# Patient Record
Sex: MALE | Race: WHITE | NOT HISPANIC OR LATINO | URBAN - METROPOLITAN AREA
[De-identification: names, ages, dates, MRNs, and addresses within clinical notes are randomized per-mention and may not be internally consistent; named-entity substitution may affect disease eponyms.]

---

## 2018-04-26 ENCOUNTER — EMERGENCY (EMERGENCY)
Facility: HOSPITAL | Age: 53
LOS: 1 days | End: 2018-04-26
Payer: COMMERCIAL

## 2018-04-26 PROCEDURE — 99283 EMERGENCY DEPT VISIT LOW MDM: CPT

## 2021-06-15 ENCOUNTER — EMERGENCY (EMERGENCY)
Facility: HOSPITAL | Age: 56
LOS: 1 days | End: 2021-06-15
Admitting: EMERGENCY MEDICINE
Payer: COMMERCIAL

## 2021-06-15 PROCEDURE — 99284 EMERGENCY DEPT VISIT MOD MDM: CPT

## 2021-06-15 PROCEDURE — 70450 CT HEAD/BRAIN W/O DYE: CPT | Mod: 26

## 2023-02-03 ENCOUNTER — NON-APPOINTMENT (OUTPATIENT)
Age: 58
End: 2023-02-03

## 2023-02-03 ENCOUNTER — APPOINTMENT (OUTPATIENT)
Dept: ORTHOPEDIC SURGERY | Facility: CLINIC | Age: 58
End: 2023-02-03
Payer: OTHER MISCELLANEOUS

## 2023-02-03 VITALS — WEIGHT: 160 LBS | BODY MASS INDEX: 22.4 KG/M2 | HEIGHT: 71 IN

## 2023-02-03 DIAGNOSIS — I10 ESSENTIAL (PRIMARY) HYPERTENSION: ICD-10-CM

## 2023-02-03 PROBLEM — Z00.00 ENCOUNTER FOR PREVENTIVE HEALTH EXAMINATION: Status: ACTIVE | Noted: 2023-02-03

## 2023-02-03 PROCEDURE — 73030 X-RAY EXAM OF SHOULDER: CPT | Mod: RT

## 2023-02-03 PROCEDURE — 99072 ADDL SUPL MATRL&STAF TM PHE: CPT

## 2023-02-03 PROCEDURE — 99204 OFFICE O/P NEW MOD 45 MIN: CPT

## 2023-02-21 ENCOUNTER — FORM ENCOUNTER (OUTPATIENT)
Age: 58
End: 2023-02-21

## 2023-03-01 ENCOUNTER — APPOINTMENT (OUTPATIENT)
Dept: ORTHOPEDIC SURGERY | Facility: CLINIC | Age: 58
End: 2023-03-01
Payer: OTHER MISCELLANEOUS

## 2023-03-01 PROCEDURE — 99214 OFFICE O/P EST MOD 30 MIN: CPT

## 2023-03-01 PROCEDURE — 99072 ADDL SUPL MATRL&STAF TM PHE: CPT

## 2023-04-05 ENCOUNTER — APPOINTMENT (OUTPATIENT)
Dept: ORTHOPEDIC SURGERY | Facility: CLINIC | Age: 58
End: 2023-04-05
Payer: OTHER MISCELLANEOUS

## 2023-04-05 PROCEDURE — 99214 OFFICE O/P EST MOD 30 MIN: CPT

## 2023-05-05 NOTE — HISTORY OF PRESENT ILLNESS
[de-identified] : 56 y/o male presenting to the office with right shoulder pain (Acute traumatic fracture) from work related injury that occurred on 12/8/22. Patient reports traumatic slip and fall that resulted in fracture dislocation. He works as  for [employer]. \par \par He has been out of work since the injury. Patient has been under the care of another orthopedic provider. He was initially treated with sling immobilizer for 4 weeks and has recently started physical therapy. He continues to note limited ROM and severe pain. Patient presents today as second opinion for surgery.

## 2023-05-05 NOTE — WORK
[Fracture] : fracture [Was the competent medical cause of the injury] : was the competent medical cause of the injury [Are consistent with the injury] : are consistent with the injury [Total (100%)] : total (100%) [Does not reveal pre-existing condition(s) that may affect treatment/prognosis] : does not reveal pre-existing condition(s) that may affect treatment/prognosis [Cannot return to work because ________] : cannot return to work because [unfilled] [Patient] : patient [No Rx restrictions] : No Rx restrictions. [I provided the services listed above] :  I provided the services listed above. [FreeTextEntry1] : fair

## 2023-05-05 NOTE — WORK
[Fracture] : fracture [Was the competent medical cause of the injury] : was the competent medical cause of the injury [Total] : total [Cannot return to work because ________] : cannot return to work because [unfilled] [15+ days] : 15+ days [Patient] : patient [No Rx restrictions] : No Rx restrictions. [I provided the services listed above] :  I provided the services listed above. [FreeTextEntry1] : fair

## 2023-05-05 NOTE — PHYSICAL EXAM
[Right] : right shoulder [Fracture] : Fracture [de-identified] : Constitutional: The general appearance of the patient is well developed, well nourished, no deformities and well groomed. Normal\par \par Gait: Gait and function is as follows: normal gait.\par \par Skin: Head and neck visualized skin is normal. Left upper extremity visualized skin is normal. Right upper extremity visualized skin is normal. Thoracic Skin of the thoracic spine shows visualized skin is normal.\par \par Cardiovascular: palpable radial pulse bilaterally, good capillary refill in digits of the bilateral upper extremities and no temperature or color changes in the bilateral upper extremities.\par \par Lymphatic: Normal Palpation of lymph nodes in the cervical.\par \par Neurologic: fine motor control in the bilateral upper extremities is intact. Deep Tendon Reflexes in Upper and Lower Extremities Negative Cruz's in the bilateral upper extremities. The patient is oriented to time, place and person. Sensation to light touch intact in the bilateral upper extremities. Mood and Affect is normal.\par \par Right Shoulder: Inspection of the shoulder/upper arm is as follows: no scapula winging, no biceps deformity and no AC joint deformity. Palpation of the shoulder/upper arm is as follows: There is tenderness at the proximal biceps tendon. Range of motion of the shoulder is as follows: Pain with internal rotation, external rotation, abduction and forward flexion. Strength of the shoulder is as follows: Supraspinatus 4/5. External Rotation 4/5. Internal Rotation 4/5. Deltoid 5/5 Ligament Stability and Special Tests of the shoulder is as follows: Neer test is positive. Sharp' test is positive. Speed's test is positive.\par \par Left Shoulder: Inspection of the shoulder/upper arm is as follows: There is a full, pain-free, stable range of motion of the shoulder with normal strength and no tenderness to palpation.\par \par Neck:\par \par Inspection / Palpation of the cervical spine is as follows: mild paracervical tenderness. Range of motion of the cervical spine is as follows: moderately decreased range of motion of the cervical spine. Stability testing for the cervical spine is as follows Stable range of motion.\par \par Back, including spine: Inspection / Palpation of the thoracic/lumbar spine is as follows: There is a full, pain free, stable range of motion of the thoracic spine with a normal tone and not tenderness to palpation..\par

## 2023-05-05 NOTE — HISTORY OF PRESENT ILLNESS
[de-identified] : 56 y/o male presenting to the office with right shoulder pain (Acute traumatic fracture) from work related injury that occurred on 12/8/22. Patient reports traumatic slip and fall that resulted in fracture dislocation. He works as  for [employer]. He has been out of work since the injury. Patient has been under the care of another orthopedic provider. He was initially treated with sling immobilizer for 4 weeks and has recently started physical therapy. He continues to note limited ROM and severe pain. Patient presents today as second opinion for surgery.\par \par 3/1/23: 56 y/o male presenting to the office with right shoulder pain (Acute traumatic fracture) from work related injury that occurred on 12/8/22. Surgery was approved, patient elects to proceed. Patient is a smoker, but states he is practicing cessation from smoking. He continues to note limited ROM and severe pain.

## 2023-05-05 NOTE — HISTORY OF PRESENT ILLNESS
[de-identified] : 58 y/o male presenting to the office with right shoulder pain (Acute traumatic fracture) from work related injury that occurred on 12/8/22. Patient reports traumatic slip and fall that resulted in fracture dislocation. He works as  for [employer]. He has been out of work since the injury. Patient has been under the care of another orthopedic provider. He was initially treated with sling immobilizer for 4 weeks and has recently started physical therapy. He continues to note limited ROM and severe pain. Patient presents today as second opinion for surgery.\par \par 3/1/23: 58 y/o male presenting to the office with right shoulder pain (Acute traumatic fracture) from work related injury that occurred on 12/8/22. Surgery was approved, patient elects to proceed. Patient is a smoker, but states he is practicing cessation from smoking. He continues to note limited ROM and severe pain.\par \par 4/5/23: Patient presents for follow up of right shoulder pain. He continues to have pain and difficulty with ROM. He has been working on smoking cessation. He was recently approved for surgery.

## 2023-05-05 NOTE — PHYSICAL EXAM
[de-identified] : Constitutional: The general appearance of the patient is well developed, well nourished, no deformities and well groomed. Normal\par \par Gait: Gait and function is as follows: normal gait.\par \par Skin: Head and neck visualized skin is normal. Left upper extremity visualized skin is normal. Right upper extremity visualized skin is normal. Thoracic Skin of the thoracic spine shows visualized skin is normal.\par \par Cardiovascular: palpable radial pulse bilaterally, good capillary refill in digits of the bilateral upper extremities and no temperature or color changes in the bilateral upper extremities.\par \par Lymphatic: Normal Palpation of lymph nodes in the cervical.\par \par Neurologic: fine motor control in the bilateral upper extremities is intact. Deep Tendon Reflexes in Upper and Lower Extremities Negative Cruz's in the bilateral upper extremities. The patient is oriented to time, place and person. Sensation to light touch intact in the bilateral upper extremities. Mood and Affect is normal.\par \par Right Shoulder: Inspection of the shoulder/upper arm is as follows: no scapula winging, no biceps deformity and no AC joint deformity. Palpation of the shoulder/upper arm is as follows: There is tenderness at the proximal biceps tendon. Range of motion of the shoulder is as follows: Pain with internal rotation, external rotation, abduction and forward flexion. Strength of the shoulder is as follows: Supraspinatus 4/5. External Rotation 4/5. Internal Rotation 4/5. Deltoid 5/5 Ligament Stability and Special Tests of the shoulder is as follows: Neer test is positive. Sharp' test is positive. Speed's test is positive.\par \par Left Shoulder: Inspection of the shoulder/upper arm is as follows: There is a full, pain-free, stable range of motion of the shoulder with normal strength and no tenderness to palpation.\par \par Neck:\par \par Inspection / Palpation of the cervical spine is as follows: mild paracervical tenderness. Range of motion of the cervical spine is as follows: moderately decreased range of motion of the cervical spine. Stability testing for the cervical spine is as follows Stable range of motion.\par \par Back, including spine: Inspection / Palpation of the thoracic/lumbar spine is as follows: There is a full, pain free, stable range of motion of the thoracic spine with a normal tone and not tenderness to palpation..\par

## 2023-05-05 NOTE — PHYSICAL EXAM
[Right] : right shoulder [Fracture] : Fracture [de-identified] : Constitutional: The general appearance of the patient is well developed, well nourished, no deformities and well groomed. Normal\par \par Gait: Gait and function is as follows: normal gait.\par \par Skin: Head and neck visualized skin is normal. Left upper extremity visualized skin is normal. Right upper extremity visualized skin is normal. Thoracic Skin of the thoracic spine shows visualized skin is normal.\par \par Cardiovascular: palpable radial pulse bilaterally, good capillary refill in digits of the bilateral upper extremities and no temperature or color changes in the bilateral upper extremities.\par \par Lymphatic: Normal Palpation of lymph nodes in the cervical.\par \par Neurologic: fine motor control in the bilateral upper extremities is intact. Deep Tendon Reflexes in Upper and Lower Extremities Negative Cruz's in the bilateral upper extremities. The patient is oriented to time, place and person. Sensation to light touch intact in the bilateral upper extremities. Mood and Affect is normal.\par \par Right Shoulder: Inspection of the shoulder/upper arm is as follows: no scapula winging, no biceps deformity and no AC joint deformity. Palpation of the shoulder/upper arm is as follows: There is tenderness at the proximal biceps tendon. Range of motion of the shoulder is as follows: Pain with internal rotation, external rotation, abduction and forward flexion. Strength of the shoulder is as follows: Supraspinatus 4/5. External Rotation 4/5. Internal Rotation 4/5. Deltoid 5/5 Ligament Stability and Special Tests of the shoulder is as follows: Neer test is positive. Sharp' test is positive. Speed's test is positive.\par \par Left Shoulder: Inspection of the shoulder/upper arm is as follows: There is a full, pain-free, stable range of motion of the shoulder with normal strength and no tenderness to palpation.\par \par Neck:\par \par Inspection / Palpation of the cervical spine is as follows: mild paracervical tenderness. Range of motion of the cervical spine is as follows: moderately decreased range of motion of the cervical spine. Stability testing for the cervical spine is as follows Stable range of motion.\par \par Back, including spine: Inspection / Palpation of the thoracic/lumbar spine is as follows: There is a full, pain free, stable range of motion of the thoracic spine with a normal tone and not tenderness to palpation..\par

## 2023-05-05 NOTE — WORK
[Fracture] : fracture [Was the competent medical cause of the injury] : was the competent medical cause of the injury [Are consistent with the injury] : are consistent with the injury [Consistent with my objective findings] : consistent with my objective findings [Total] : total [Cannot return to work because ________] : cannot return to work because [unfilled] [15+ days] : 15+ days [Patient] : patient [No Rx restrictions] : No Rx restrictions. [I provided the services listed above] :  I provided the services listed above. [FreeTextEntry1] : fair

## 2023-05-05 NOTE — DISCUSSION/SUMMARY
[de-identified] : 56 y/o male presenting to the office with right shoulder pain from work related injury that occurred on 12/8/22. Patient reports traumatic slip and fall that resulted in fracture dislocation. He works as  for [employer].\par \par He continues to note limited ROM and severe pain. \par Patient wishes to proceed with surgical intervention, surgery was approved. \par Discussed continued smoking cessation.\par Patient will need a CT scan for pre operative planning, CT referral provided. \par Patient will need medical and cardiac clearance 30 days within surgery date.\par \par Patient will remain out of work until further notice\par \par Pt has an acute traumatic proximal humerus fracture and is here for surgical options.\par Arthroplasty is recommended due to the fracture pattern and the malunion.\par The patient is having severe pain with forward flexion and/or pseudoparalysis. \par The patient elects to proceed with surgical treatment.\par \par He will follow up at preop visit. \par \par The patient is indicated for a Right reverse shoulder arthroplasty, Open Treatment of Proximal humerus fracture biceps tenodesis.\par  \par * Surgery: Considering patient has failed all conservative treatment we are proceeding with:\par -	Right shoulder Reverse Shoulder Arthroplasty (CPT 17187) with Biceps Tenodesis (CPT 80504), ORIF right proximal humerus [id number]\par Pt would like surgery June 20th\par \par The patient will require a [place] Arc 2.0 brace, cryotherapy, and 12 weeks of post-operative physical therapy.\par The patient will require a medical clearance and cardiac (smoker)\par [facility], 2 hours, and one assistant.\par  \par (1) We discussed a comprehensive treatment plans that included a prescription management plan involving the use of prescription strength medications to include Ibuprofen 600-800 mg TID, versus 500-650 mg Tylenol. We also discussed prescribing topical diclofenac (Voltaren gel) as well as once daily Meloxicam 15 mg.\par (2) The patient has More Than One chronic injuries/illnesses as outlined, discussed, and documented by ICD 10 codes listed, as well as the HPI and Plan section.\par There is a moderate risk of morbidity with further treatment, especially from use of prescription strength medications and possible side effects of these medications which include upset stomach and cardiac/renal issues with long term use were discussed.\par (3) I recommended that the patient follow-up with their medical physician to discuss any significant specific potential issues with long term use such as interactions with current medications or with exacerbation of underlying medical morbidities. \par \par Attestation:\par I, Deandra Alberto, attest that this documentation has been prepared under the direction and in the presence of Provider Rohit Tobin MD.\par The documentation recorded by the scribe, in my presence, accurately reflects the service I personally performed, and the decisions made by me with my edits as appropriate.\par Rohit Tobin MD

## 2023-05-05 NOTE — DISCUSSION/SUMMARY
[de-identified] : 58 y/o male presenting to the office with right shoulder pain from work related injury that occurred on 12/8/22. Patient reports traumatic slip and fall that resulted in fracture dislocation. He works as  for [employer].\par X-rays were reviewed with the patient today\par He continues to note limited ROM and severe pain. \par Patient wishes to submit for surgery authorization - needs to be expedited due to acute trauamtic fracture\par Patient will need a CT scan for pre operative planning\par Patient will need medical and cardiac clearance 30 days within surgery date.\par \par Patient will remain out of work until further notice\par \par Pt has an acute traumatic proxmial humerus fracture and is here for surgical options.\par Arthroplasty is recommended due to the fracture pattern and the malunion.\par The patient is having severe pain with forward flexion and/or pseudoparalysis. \par The patient is interested in pursuing surgical treatment.\par \par We had a lengthy discussion about the surgery as well as the recovery. We discussed the risks which include but are not limited to infection, bleeding, need for blood transfusions, failure of treatment to alleviate all pain or improve function, the risk of injury to nerves and blood vessels.  There is also the risks inherent to anesthesia as well.  We discussed that given the patient’s distorted anatomy as a result of arthritis, these risks are real although every attempt will be made to mitigate these at the time of surgery.\par Pt understands there is no guarantee of success, however there is a high likelihood of functional improvement and pain relief. The patient understood all this was discussed.\par \par The patient is indicated for a Right reverse shoulder arthroplasty, Open Treatment of Proximal humerus fracture biceps tenodesis.\par  \par * Surgery: Considering patient has failed all conservative treatment we are requesting authorization for:\par -	Right shoulder Reverse Shoulder Arthroplasty (CPT 19699) with Biceps Tenodesis (CPT 82646), ORIF right proxmial humerus [id number]\par Pt would like surgery (soon after approval)\par \par The patient will require a [place] Arc 2.0 brace, cryotherapy, and 12 weeks of post-operative physical therapy.\par The patient will require a medical clearance and cardiac (smoker)\par The doctor will require the [employer] representative, 2 hours, and one assistant.\par  \par \par (1) We discussed a comprehensive treatment plans that included a prescription management plan involving the use of prescription strength medications to include Ibuprofen 600- 800 mg TID, versus 500- 650 mg Tylenol. We also discussed prescribing topical diclofenac (Voltaren gel) as well as once daily Meloxicam 15 mg.\par \par (2) The patient has More Than One chronic injuries/illnesses as outlined, discussed, and documented by ICD 10 codes listed, as well as the HPI and Plan section.\par \par There is a moderate risk of morbidity with further treatment, especially from use of prescription strength medications and possible side effects of these medications which include upset stomach and cardiac/renal issues with long term use were discussed.\par \par (3) I recommended that the patient follow-up with their medical physician to discuss any significant specific potential issues with long term use such as interactions with current medications or with exacerbation of underlying medical morbidities.\par \par Attestation:\par \par I, Neel Warren, attest that this documentation has been prepared under the direction and in the presence of Provider Rohit Tobin MD.\par \par The documentation recorded by the scribe, in my presence, accurately reflects the service I personally performed, and the decisions made by me with my edits as appropriate.\par \par Rohit Tobin MD\par

## 2023-06-14 ENCOUNTER — APPOINTMENT (OUTPATIENT)
Dept: ORTHOPEDIC SURGERY | Facility: CLINIC | Age: 58
End: 2023-06-14

## 2023-06-14 ENCOUNTER — APPOINTMENT (OUTPATIENT)
Dept: ORTHOPEDIC SURGERY | Facility: CLINIC | Age: 58
End: 2023-06-14
Payer: OTHER MISCELLANEOUS

## 2023-06-14 VITALS — WEIGHT: 160 LBS | HEIGHT: 71 IN | BODY MASS INDEX: 22.4 KG/M2

## 2023-06-14 PROCEDURE — 99214 OFFICE O/P EST MOD 30 MIN: CPT | Mod: ACP

## 2023-06-14 PROCEDURE — L3960: CPT | Mod: RT

## 2023-06-19 NOTE — HISTORY OF PRESENT ILLNESS
[de-identified] : 58 y/o male presenting to the office with right shoulder pain (Acute traumatic fracture) from work related injury that occurred on 12/8/22. Patient reports traumatic slip and fall that resulted in fracture dislocation. He works as  for [employer]. He has been out of work since the injury. Patient has been under the care of another orthopedic provider. He was initially treated with sling immobilizer for 4 weeks and has recently started physical therapy. He continues to note limited ROM and severe pain. Patient presents today as second opinion for surgery.\par \par 3/1/23: 58 y/o male presenting to the office with right shoulder pain (Acute traumatic fracture) from work related injury that occurred on 12/8/22. Surgery was approved, patient elects to proceed. Patient is a smoker, but states he is practicing cessation from smoking. He continues to note limited ROM and severe pain.\par \par 4/5/23: Patient presents for follow up of right shoulder pain. He continues to have pain and difficulty with ROM. He has been working on smoking cessation. He was recently approved for surgery. \par \par 6/14/23: Pt here for a follow up of right shoulder pain.  Patient reports worsening pain.  Pain has been affecting his quality of life.  He wishes to discuss surgery.  Does report elevated A1c level at recent PST.

## 2023-06-19 NOTE — DISCUSSION/SUMMARY
[de-identified] : 56 y/o male presenting to the office with right shoulder pain from work related injury that occurred on 12/8/22. Patient reports traumatic slip and fall that resulted in fracture dislocation. He works as  for [employer].\par \par He continues to note limited ROM and severe pain. \par Patient is indicated for right reverse total shoulder arthroplasty.\par patient wishes to proceed with surgical intervention, surgery was approved. \par Discussed continued smoking cessation.\par During PST patient was found to have elevated hemoglobin A1C of 8.7\par At this point time patient is not optimized for surgery.  He will schedule outpatient appointment with endocrinologist for better blood sugar control.\par Discussed with patient once his level is below 8.0 we can consider rescheduling joint replacement.\par Patient will follow-up in 4 to 6 weeks and updated blood work.\par Patient will remain out of work until further notice\par \par \par The patient is indicated for a Right reverse shoulder arthroplasty, Open Treatment of Proximal humerus fracture biceps tenodesis.\par  \par * Surgery: Considering patient has failed all conservative treatment we are proceeding with:\par -	Right shoulder Reverse Shoulder Arthroplasty (CPT 65010) with Biceps Tenodesis (CPT 21570), ORIF right proximal humerus [id number]\par Pt would like surgery June 20th\par \par The patient will require a [place] Arc 2.0 brace, cryotherapy, and 12 weeks of post-operative physical therapy.\par The patient will require a medical clearance and cardiac (smoker)\par The doctor will require the [employer] representative, 2 hours, and one assistant.\par  \par \par (1) We discussed a comprehensive treatment plans that included a prescription management plan involving the use of prescription strength medications to include Ibuprofen 600-800 mg TID, versus 500-650 mg Tylenol. We also discussed prescribing topical diclofenac (Voltaren gel) as well as once daily Meloxicam 15 mg.\par (2) The patient has More Than One chronic injuries/illnesses as outlined, discussed, and documented by ICD 10 codes listed, as well as the HPI and Plan section.\par There is a moderate risk of morbidity with further treatment, especially from use of prescription strength medications and possible side effects of these medications which include upset stomach and cardiac/renal issues with long term use were discussed.\par (3) I recommended that the patient follow-up with their medical physician to discuss any significant specific potential issues with long term use such as interactions with current medications or with exacerbation of underlying medical morbidities. \par

## 2023-06-19 NOTE — PHYSICAL EXAM
[de-identified] : Constitutional: The general appearance of the patient is well developed, well nourished, no deformities and well groomed. Normal\par \par Gait: Gait and function is as follows: normal gait.\par \par Skin: Head and neck visualized skin is normal. Left upper extremity visualized skin is normal. Right upper extremity visualized skin is normal. Thoracic Skin of the thoracic spine shows visualized skin is normal.\par \par Cardiovascular: palpable radial pulse bilaterally, good capillary refill in digits of the bilateral upper extremities and no temperature or color changes in the bilateral upper extremities.\par \par Lymphatic: Normal Palpation of lymph nodes in the cervical.\par \par Neurologic: fine motor control in the bilateral upper extremities is intact. Deep Tendon Reflexes in Upper and Lower Extremities Negative Cruz's in the bilateral upper extremities. The patient is oriented to time, place and person. Sensation to light touch intact in the bilateral upper extremities. Mood and Affect is normal.\par \par Right Shoulder: Inspection of the shoulder/upper arm is as follows: no scapula winging, no biceps deformity and no AC joint deformity. Palpation of the shoulder/upper arm is as follows: There is tenderness at the proximal biceps tendon. Range of motion of the shoulder is as follows: Pain with internal rotation, external rotation, abduction and forward flexion. Strength of the shoulder is as follows: Supraspinatus 4/5. External Rotation 4/5. Internal Rotation 4/5. Deltoid 5/5 Ligament Stability and Special Tests of the shoulder is as follows: Neer test is positive. Sharp' test is positive. Speed's test is positive.\par \par Left Shoulder: Inspection of the shoulder/upper arm is as follows: There is a full, pain-free, stable range of motion of the shoulder with normal strength and no tenderness to palpation.\par \par Neck:\par \par Inspection / Palpation of the cervical spine is as follows: mild paracervical tenderness. Range of motion of the cervical spine is as follows: moderately decreased range of motion of the cervical spine. Stability testing for the cervical spine is as follows Stable range of motion.\par \par Back, including spine: Inspection / Palpation of the thoracic/lumbar spine is as follows: There is a full, pain free, stable range of motion of the thoracic spine with a normal tone and not tenderness to palpation..\par

## 2023-06-20 ENCOUNTER — APPOINTMENT (OUTPATIENT)
Dept: ORTHOPEDIC SURGERY | Facility: HOSPITAL | Age: 58
End: 2023-06-20

## 2023-06-28 ENCOUNTER — APPOINTMENT (OUTPATIENT)
Dept: ORTHOPEDIC SURGERY | Facility: CLINIC | Age: 58
End: 2023-06-28

## 2023-08-03 ENCOUNTER — NON-APPOINTMENT (OUTPATIENT)
Age: 58
End: 2023-08-03

## 2023-08-03 ENCOUNTER — APPOINTMENT (OUTPATIENT)
Dept: NEPHROLOGY | Facility: CLINIC | Age: 58
End: 2023-08-03
Payer: COMMERCIAL

## 2023-08-03 VITALS
RESPIRATION RATE: 16 BRPM | SYSTOLIC BLOOD PRESSURE: 124 MMHG | WEIGHT: 145 LBS | OXYGEN SATURATION: 97 % | DIASTOLIC BLOOD PRESSURE: 80 MMHG | TEMPERATURE: 98 F | HEART RATE: 87 BPM | HEIGHT: 71 IN | BODY MASS INDEX: 20.3 KG/M2

## 2023-08-03 DIAGNOSIS — Z78.9 OTHER SPECIFIED HEALTH STATUS: ICD-10-CM

## 2023-08-03 DIAGNOSIS — Z84.1 FAMILY HISTORY OF DISORDERS OF KIDNEY AND URETER: ICD-10-CM

## 2023-08-03 DIAGNOSIS — Z83.3 FAMILY HISTORY OF DIABETES MELLITUS: ICD-10-CM

## 2023-08-03 DIAGNOSIS — F17.200 NICOTINE DEPENDENCE, UNSPECIFIED, UNCOMPLICATED: ICD-10-CM

## 2023-08-03 PROCEDURE — 99496 TRANSJ CARE MGMT HIGH F2F 7D: CPT | Mod: 25

## 2023-08-03 PROCEDURE — 99407 BEHAV CHNG SMOKING > 10 MIN: CPT

## 2023-08-03 RX ORDER — LOSARTAN POTASSIUM 25 MG/1
25 TABLET, FILM COATED ORAL
Refills: 0 | Status: ACTIVE | COMMUNITY

## 2023-08-03 RX ORDER — GABAPENTIN 300 MG/1
300 CAPSULE ORAL
Refills: 0 | Status: ACTIVE | COMMUNITY

## 2023-08-03 RX ORDER — METFORMIN HYDROCHLORIDE 1000 MG/1
1000 TABLET, FILM COATED, EXTENDED RELEASE ORAL
Refills: 0 | Status: ACTIVE | COMMUNITY

## 2023-08-03 RX ORDER — AMLODIPINE BESYLATE 10 MG/1
10 TABLET ORAL
Refills: 0 | Status: ACTIVE | COMMUNITY

## 2023-08-03 NOTE — HISTORY OF PRESENT ILLNESS
[FreeTextEntry1] : Patient is a 57 year old male admitted to [facility] on 7/27 for hyponatremia in 120s; pt is a  states he has been drinking 120 oz of coffee + coca cola daily with 1 meal a day; sodium increased to 131 in hospital and patient signed out AMA; states he feels better and has been restricting his fluid intake and taking 3 meals daily. Pt called by us within 24 hrs of dc for 7 day post hospitalization follow up.

## 2023-08-03 NOTE — PHYSICAL EXAM

## 2023-08-03 NOTE — ASSESSMENT
[FreeTextEntry1] : 1) Hyponatremia 2) Smoker 3) Pain shoulder 4) DM  Will get baseline labs r/o SIADH Fluid restrict 1.5L daily + eat more protein Discussed smoking cessation for > 7 min RTC 4-6 weeks

## 2023-08-15 LAB
ALBUMIN SERPL ELPH-MCNC: 4.4 G/DL
ANION GAP SERPL CALC-SCNC: 13 MMOL/L
APPEARANCE: CLEAR
BACTERIA: NEGATIVE /HPF
BILIRUBIN URINE: NEGATIVE
BLOOD URINE: NEGATIVE
BUN SERPL-MCNC: 8 MG/DL
CALCIUM SERPL-MCNC: 9.4 MG/DL
CAST: 0 /LPF
CHLORIDE ?TM UR-SCNC: 35 MMOL/L
CHLORIDE SERPL-SCNC: 92 MMOL/L
CO2 SERPL-SCNC: 25 MMOL/L
COLOR: YELLOW
CREAT SERPL-MCNC: 0.63 MG/DL
CREAT SPEC-SCNC: 34 MG/DL
CREAT/PROT UR: 0.2 RATIO
EGFR: 112 ML/MIN/1.73M2
EPITHELIAL CELLS: 0 /HPF
ESTIMATED AVERAGE GLUCOSE: 186 MG/DL
GLUCOSE QUALITATIVE U: 500 MG/DL
GLUCOSE SERPL-MCNC: 153 MG/DL
HBA1C MFR BLD HPLC: 8.1 %
KETONES URINE: NEGATIVE MG/DL
LEUKOCYTE ESTERASE URINE: NEGATIVE
MICROSCOPIC-UA: NORMAL
NITRITE URINE: NEGATIVE
OSMOLALITY UR: 214 MOSM/KG
PH URINE: 7
PHOSPHATE SERPL-MCNC: 3.5 MG/DL
POTASSIUM SERPL-SCNC: 4.9 MMOL/L
PROT UR-MCNC: 7 MG/DL
PROTEIN URINE: NEGATIVE MG/DL
PTH RELATED PROT SERPL-MCNC: <2 PMOL/L
RED BLOOD CELLS URINE: 0 /HPF
SODIUM ?TM SUB UR QN: 32 MMOL/L
SODIUM SERPL-SCNC: 130 MMOL/L
SPECIFIC GRAVITY URINE: 1.01
UROBILINOGEN URINE: 0.2 MG/DL
WHITE BLOOD CELLS URINE: 0 /HPF

## 2023-11-15 ENCOUNTER — APPOINTMENT (OUTPATIENT)
Dept: ORTHOPEDIC SURGERY | Facility: CLINIC | Age: 58
End: 2023-11-15
Payer: OTHER MISCELLANEOUS

## 2023-11-15 PROCEDURE — 99214 OFFICE O/P EST MOD 30 MIN: CPT

## 2023-11-15 PROCEDURE — 73030 X-RAY EXAM OF SHOULDER: CPT | Mod: RT

## 2024-01-17 ENCOUNTER — APPOINTMENT (OUTPATIENT)
Dept: ORTHOPEDIC SURGERY | Facility: CLINIC | Age: 59
End: 2024-01-17
Payer: OTHER MISCELLANEOUS

## 2024-01-17 PROCEDURE — 99214 OFFICE O/P EST MOD 30 MIN: CPT

## 2024-01-17 NOTE — PHYSICAL EXAM
[Right] : right shoulder [The fracture is in acceptable alignment. There is progression in healing seen] : The fracture is in acceptable alignment. There is progression in healing seen [de-identified] : Constitutional: The general appearance of the patient is well developed, well nourished, no deformities and well groomed. Normal\par  \par  Gait: Gait and function is as follows: normal gait.\par  \par  Skin: Head and neck visualized skin is normal. Left upper extremity visualized skin is normal. Right upper extremity visualized skin is normal. Thoracic Skin of the thoracic spine shows visualized skin is normal.\par  \par  Cardiovascular: palpable radial pulse bilaterally, good capillary refill in digits of the bilateral upper extremities and no temperature or color changes in the bilateral upper extremities.\par  \par  Lymphatic: Normal Palpation of lymph nodes in the cervical.\par  \par  Neurologic: fine motor control in the bilateral upper extremities is intact. Deep Tendon Reflexes in Upper and Lower Extremities Negative Cruz's in the bilateral upper extremities. The patient is oriented to time, place and person. Sensation to light touch intact in the bilateral upper extremities. Mood and Affect is normal.\par  \par  Right Shoulder: Inspection of the shoulder/upper arm is as follows: no scapula winging, no biceps deformity and no AC joint deformity. Palpation of the shoulder/upper arm is as follows: There is tenderness at the proximal biceps tendon. Range of motion of the shoulder is as follows: Pain with internal rotation, external rotation, abduction and forward flexion. Strength of the shoulder is as follows: Supraspinatus 4/5. External Rotation 4/5. Internal Rotation 4/5. Deltoid 5/5 Ligament Stability and Special Tests of the shoulder is as follows: Neer test is positive. Sharp' test is positive. Speed's test is positive.\par  \par  Left Shoulder: Inspection of the shoulder/upper arm is as follows: There is a full, pain-free, stable range of motion of the shoulder with normal strength and no tenderness to palpation.\par  \par  Neck:\par  \par  Inspection / Palpation of the cervical spine is as follows: mild paracervical tenderness. Range of motion of the cervical spine is as follows: moderately decreased range of motion of the cervical spine. Stability testing for the cervical spine is as follows Stable range of motion.\par  \par  Back, including spine: Inspection / Palpation of the thoracic/lumbar spine is as follows: There is a full, pain free, stable range of motion of the thoracic spine with a normal tone and not tenderness to palpation..\par

## 2024-01-17 NOTE — DISCUSSION/SUMMARY
[de-identified] : 59 y/o male presenting to the office with right shoulder pain from work related injury that occurred on 12/8/22.  Patient reports traumatic slip and fall that resulted in fracture dislocation. He works as  for [employer].  He continues to note limited ROM and severe pain.  Patient is indicated for right reverse total shoulder arthroplasty  Patient will remain out of work until further notice Follow up 6 weeks  Patient is having cervical pain and limited motion. Pt will see Cervical provider to determine whether this is believed to be a result of the work-related injury.     (1) We discussed a comprehensive treatment plans that included a prescription management plan involving the use of prescription strength medications to include Ibuprofen 600-800 mg TID, versus 500-650 mg Tylenol. We also discussed prescribing topical diclofenac (Voltaren gel) as well as once daily Meloxicam 15 mg. (2) The patient has More Than One chronic injuries/illnesses as outlined, discussed, and documented by ICD 10 codes listed, as well as the HPI and Plan section. There is a moderate risk of morbidity with further treatment, especially from use of prescription strength medications and possible side effects of these medications which include upset stomach and cardiac/renal issues with long term use were discussed. (3) I recommended that the patient follow-up with their medical physician to discuss any significant specific potential issues with long term use such as interactions with current medications or with exacerbation of underlying medical morbidities.   Attestation: I, Ana Fonseca , attest that this documentation has been prepared under the direction and in the presence of Provider Rohit Tobin MD. The documentation recorded by the scribe, in my presence, accurately reflects the service I personally performed, and the decisions made by me with my edits as appropriate. Rohit Tobin MD

## 2024-01-17 NOTE — HISTORY OF PRESENT ILLNESS
[de-identified] : 56 y/o male presenting to the office with right shoulder pain (Acute traumatic fracture) from work related injury that occurred on 12/8/22. Patient reports traumatic slip and fall that resulted in fracture dislocation. He works as  for [employer]. He has been out of work since the injury. Patient has been under the care of another orthopedic provider. He was initially treated with sling immobilizer for 4 weeks and has recently started physical therapy. He continues to note limited ROM and severe pain. Patient presents today as second opinion for surgery.  3/1/23: 56 y/o male presenting to the office with right shoulder pain (Acute traumatic fracture) from work related injury that occurred on 12/8/22. Surgery was approved, patient elects to proceed. Patient is a smoker, but states he is practicing cessation from smoking. He continues to note limited ROM and severe pain.  4/5/23: Patient presents for follow up of right shoulder pain. He continues to have pain and difficulty with ROM. He has been working on smoking cessation. He was recently approved for surgery.   6/14/23: Pt here for a follow up of right shoulder pain.  Patient reports worsening pain.  Pain has been affecting his quality of life.  He wishes to discuss surgery.  Does report elevated A1c level at recent PST.  11/15/23: Patient returns today for follow up of right shoulder pain.  Patient continues to report constant pain and limited range of motion.  He states his most recent hemoglobin A1c was 7.1.  1/17/24: Patient here for right shoulder pain. Pt admits the pain has not gotten better since last visit.  Patient is also reporting that he has crepitus pain and stiffness in his neck with radicular symptoms radiating down into his hand.  He reports that this was not present before his work-related fracture.

## 2024-01-30 ENCOUNTER — RESULT REVIEW (OUTPATIENT)
Age: 59
End: 2024-01-30

## 2024-01-31 ENCOUNTER — APPOINTMENT (OUTPATIENT)
Dept: ORTHOPEDIC SURGERY | Facility: CLINIC | Age: 59
End: 2024-01-31
Payer: COMMERCIAL

## 2024-01-31 VITALS — WEIGHT: 145 LBS | HEIGHT: 71 IN | BODY MASS INDEX: 20.3 KG/M2

## 2024-01-31 DIAGNOSIS — M54.2 CERVICALGIA: ICD-10-CM

## 2024-01-31 DIAGNOSIS — M54.12 RADICULOPATHY, CERVICAL REGION: ICD-10-CM

## 2024-01-31 DIAGNOSIS — E11.9 TYPE 2 DIABETES MELLITUS W/OUT COMPLICATIONS: ICD-10-CM

## 2024-01-31 PROCEDURE — 72050 X-RAY EXAM NECK SPINE 4/5VWS: CPT

## 2024-01-31 PROCEDURE — 99203 OFFICE O/P NEW LOW 30 MIN: CPT

## 2024-02-04 NOTE — DATA REVIEWED
[FreeTextEntry1] : On my interpretations of these images from [facility] in house on 01/31/2024: I have independently reviewed and interpreted these images and reports. 4 V cervical XR reveals mod DDD c6/7 and mild DDD of c4/5 and C5/6, flex /ex reveals no listhesis.

## 2024-02-04 NOTE — DISCUSSION/SUMMARY
[de-identified] : 57 y/o male with cervical radiculopathy. I believe that his cervical radiculopathy has been caused by his workplace injury on 12/8/22. I am requesting a cervical MRI, since the patient has neck pain since workplace injury and in addition plans to proceed with reverse TSA, for which it is imperative that C5 root be working properly, to eval for HNP VS stenosis. F/U in 6-8 wks.   Prior to appointment and during encounter with patient extensive medical records were reviewed including but not limited to, hospital records, out patient records, imaging results, and lab data. During this appointment the patient was examined, diagnoses were discussed and explained in a face to face manner. In addition extensive time was spent reviewing aforementioned diagnostic studies. Counseling including abnormal image results, differential diagnoses, treatment options, risk and benefits, lifestyle changes, current condition, and current medications was performed. Patient's comments, questions, and concerns were address and patient verbalized understanding. Based on this patient's presentation at our office, which is an orthopedic spine surgeon's office, this patient inherently / intrinsically has a risk, however minute, of developing issues such as Cauda equina syndrome, bowel and bladder changes, or progression of motor or neurological deficits such as paralysis which may be permanent.   I, Cora Parsons, attest that this documentation has been prepared under the direction and in the presence of provider Brady Truong MD.

## 2024-02-04 NOTE — PHYSICAL EXAM
[NL (80)] : left lateral rotation 80 degrees [4___] : right grasp 4[unfilled]/5 [de-identified] : Constitutional: - General Appearance: Unremarkable Body Habitus Well Developed Well Nourished Body Habitus No Deformities Well Groomed Ability To communicate: Normal Neurologic: Global sensation is intact to upper and lower extremities. See examination of Neck and/or Spine for exceptions. Orientation to Time, Place and Person is: Normal Mood And Affect is Normal Skin: - Head/Face, Right Upper/Lower Extremity, Left Upper/Lower Extremity: Normal See Examination of Neck and/or Spine for exceptions Cardiovascular: Peripheral Cardiovascular System is Normal Palpation of Lymph Nodes: Normal Palpation of lymph nodes in: Axilla, Cervical, Inguinal Abnormal Palpation of lymph nodes in: None  [] : negative Spurling [FreeTextEntry8] : audible crepitus.  [de-identified] : paresthesia's in C6 or C7 Distributuion.  [TWNoteComboBox7] : forward flexion 20 degrees [de-identified] : extension 10 degrees [TWNoteComboBox6] : right lateral rotation 60 degrees

## 2024-02-04 NOTE — HISTORY OF PRESENT ILLNESS
[Radiating] : radiating [Sharp] : sharp [Not working due to injury] : Work status: not working due to injury [de-identified] : 01/31/2024: Patient presenting today for a new issue. Workplace injury on 12/8/22, he works at a [employer], patient slipped and fell headfirst, down hole, he reports RT shoulder and rt sided neck pain with radicular symptoms since. Reports no audible crepitus prior to injury, now "hears clicking". Full extension of neck results in radiating pain down RT arm.  Indicated for reverse TSA by Dr. Tobin.   DOI: 12/8/22   [] : no [FreeTextEntry6] : numbness [FreeTextEntry7] : arm/elbow/hand [FreeTextEntry9] : turning neck to the left [de-identified] : turning neck to the right

## 2024-02-28 ENCOUNTER — APPOINTMENT (OUTPATIENT)
Dept: ORTHOPEDIC SURGERY | Facility: CLINIC | Age: 59
End: 2024-02-28
Payer: OTHER MISCELLANEOUS

## 2024-02-28 VITALS — HEIGHT: 71 IN | WEIGHT: 165 LBS | BODY MASS INDEX: 23.1 KG/M2

## 2024-02-28 DIAGNOSIS — N28.9 DISORDER OF KIDNEY AND URETER, UNSPECIFIED: ICD-10-CM

## 2024-02-28 DIAGNOSIS — E87.1 HYPO-OSMOLALITY AND HYPONATREMIA: ICD-10-CM

## 2024-02-28 PROCEDURE — 99214 OFFICE O/P EST MOD 30 MIN: CPT

## 2024-02-28 NOTE — PHYSICAL EXAM
[de-identified] : Constitutional: The general appearance of the patient is well developed, well nourished, no deformities and well groomed. Normal\par  \par  Gait: Gait and function is as follows: normal gait.\par  \par  Skin: Head and neck visualized skin is normal. Left upper extremity visualized skin is normal. Right upper extremity visualized skin is normal. Thoracic Skin of the thoracic spine shows visualized skin is normal.\par  \par  Cardiovascular: palpable radial pulse bilaterally, good capillary refill in digits of the bilateral upper extremities and no temperature or color changes in the bilateral upper extremities.\par  \par  Lymphatic: Normal Palpation of lymph nodes in the cervical.\par  \par  Neurologic: fine motor control in the bilateral upper extremities is intact. Deep Tendon Reflexes in Upper and Lower Extremities Negative Cruz's in the bilateral upper extremities. The patient is oriented to time, place and person. Sensation to light touch intact in the bilateral upper extremities. Mood and Affect is normal.\par  \par  Right Shoulder: Inspection of the shoulder/upper arm is as follows: no scapula winging, no biceps deformity and no AC joint deformity. Palpation of the shoulder/upper arm is as follows: There is tenderness at the proximal biceps tendon. Range of motion of the shoulder is as follows: Pain with internal rotation, external rotation, abduction and forward flexion. Strength of the shoulder is as follows: Supraspinatus 4/5. External Rotation 4/5. Internal Rotation 4/5. Deltoid 5/5 Ligament Stability and Special Tests of the shoulder is as follows: Neer test is positive. Sharp' test is positive. Speed's test is positive.\par  \par  Left Shoulder: Inspection of the shoulder/upper arm is as follows: There is a full, pain-free, stable range of motion of the shoulder with normal strength and no tenderness to palpation.\par  \par  Neck:\par  \par  Inspection / Palpation of the cervical spine is as follows: mild paracervical tenderness. Range of motion of the cervical spine is as follows: moderately decreased range of motion of the cervical spine. Stability testing for the cervical spine is as follows Stable range of motion.\par  \par  Back, including spine: Inspection / Palpation of the thoracic/lumbar spine is as follows: There is a full, pain free, stable range of motion of the thoracic spine with a normal tone and not tenderness to palpation..\par

## 2024-02-28 NOTE — DISCUSSION/SUMMARY
[de-identified] : 57 y/o male presenting to the office with right shoulder pain from work related injury that occurred on 12/8/22.  Patient reports traumatic slip and fall that resulted in fracture dislocation. He works as  for [employer].  He continues to note limited ROM and severe pain.  Patient is indicated for right reverse total shoulder arthroplasty  He is tentatively scheduled for April 9, 2024. Patient will follow-up in 1 month for preop evaluation.  With respect to his cervical spine he will continue evaluation by Dr. Painter   (1) We discussed a comprehensive treatment plans that included a prescription management plan involving the use of prescription strength medications to include Ibuprofen 600-800 mg TID, versus 500-650 mg Tylenol. We also discussed prescribing topical diclofenac (Voltaren gel) as well as once daily Meloxicam 15 mg. (2) The patient has More Than One chronic injuries/illnesses as outlined, discussed, and documented by ICD 10 codes listed, as well as the HPI and Plan section. There is a moderate risk of morbidity with further treatment, especially from use of prescription strength medications and possible side effects of these medications which include upset stomach and cardiac/renal issues with long term use were discussed. (3) I recommended that the patient follow-up with their medical physician to discuss any significant specific potential issues with long term use such as interactions with current medications or with exacerbation of underlying medical morbidities.   Attestation: I, Ana Fonseca , attest that this documentation has been prepared under the direction and in the presence of Provider Rohit Tobin MD. The documentation recorded by the scribe, in my presence, accurately reflects the service I personally performed, and the decisions made by me with my edits as appropriate. Rohit Tobin MD

## 2024-02-28 NOTE — HISTORY OF PRESENT ILLNESS
[de-identified] : 58 y/o male presenting to the office with right shoulder pain (Acute traumatic fracture) from work related injury that occurred on 12/8/22. Patient reports traumatic slip and fall that resulted in fracture dislocation. He works as  for [employer]. He has been out of work since the injury. Patient has been under the care of another orthopedic provider. He was initially treated with sling immobilizer for 4 weeks and has recently started physical therapy. He continues to note limited ROM and severe pain. Patient presents today as second opinion for surgery.  3/1/23: 58 y/o male presenting to the office with right shoulder pain (Acute traumatic fracture) from work related injury that occurred on 12/8/22. Surgery was approved, patient elects to proceed. Patient is a smoker, but states he is practicing cessation from smoking. He continues to note limited ROM and severe pain.  4/5/23: Patient presents for follow up of right shoulder pain. He continues to have pain and difficulty with ROM. He has been working on smoking cessation. He was recently approved for surgery.   6/14/23: Pt here for a follow up of right shoulder pain.  Patient reports worsening pain.  Pain has been affecting his quality of life.  He wishes to discuss surgery.  Does report elevated A1c level at recent PST.  11/15/23: Patient returns today for follow up of right shoulder pain.  Patient continues to report constant pain and limited range of motion.  He states his most recent hemoglobin A1c was 7.1.  1/17/24: Patient here for right shoulder pain. Pt admits the pain has not gotten better since last visit.  Patient is also reporting that he has crepitus pain and stiffness in his neck with radicular symptoms radiating down into his hand.  He reports that this was not present before his work-related fracture. 2/28/24: Patient returns today for a follow-up of right shoulder pain.  Reports no interval improvement over the last several weeks.  Pain is getting progressively worse.  Tentatively scheduled for reverse arthroplasty April 9.

## 2024-03-27 ENCOUNTER — APPOINTMENT (OUTPATIENT)
Dept: ORTHOPEDIC SURGERY | Facility: CLINIC | Age: 59
End: 2024-03-27
Payer: OTHER MISCELLANEOUS

## 2024-03-27 DIAGNOSIS — M19.111 POST-TRAUMATIC OSTEOARTHRITIS, RIGHT SHOULDER: ICD-10-CM

## 2024-03-27 DIAGNOSIS — M75.51 BURSITIS OF RIGHT SHOULDER: ICD-10-CM

## 2024-03-27 PROCEDURE — 99214 OFFICE O/P EST MOD 30 MIN: CPT

## 2024-03-27 NOTE — HISTORY OF PRESENT ILLNESS
[de-identified] : 56 y/o male presenting to the office with right shoulder pain (Acute traumatic fracture) from work related injury that occurred on 12/8/22. Patient reports traumatic slip and fall that resulted in fracture dislocation. He works as  for [employer]. He has been out of work since the injury. Patient has been under the care of another orthopedic provider. He was initially treated with sling immobilizer for 4 weeks and has recently started physical therapy. He continues to note limited ROM and severe pain. Patient presents today as second opinion for surgery.  3/1/23: 56 y/o male presenting to the office with right shoulder pain (Acute traumatic fracture) from work related injury that occurred on 12/8/22. Surgery was approved, patient elects to proceed. Patient is a smoker, but states he is practicing cessation from smoking. He continues to note limited ROM and severe pain.  4/5/23: Patient presents for follow up of right shoulder pain. He continues to have pain and difficulty with ROM. He has been working on smoking cessation. He was recently approved for surgery.   6/14/23: Pt here for a follow up of right shoulder pain.  Patient reports worsening pain.  Pain has been affecting his quality of life.  He wishes to discuss surgery.  Does report elevated A1c level at recent PST.  11/15/23: Patient returns today for follow up of right shoulder pain.  Patient continues to report constant pain and limited range of motion.  He states his most recent hemoglobin A1c was 7.1.  1/17/24: Patient here for right shoulder pain. Pt admits the pain has not gotten better since last visit.  Patient is also reporting that he has crepitus pain and stiffness in his neck with radicular symptoms radiating down into his hand.  He reports that this was not present before his work-related fracture. 2/28/24: Patient returns today for a follow-up of right shoulder pain.  Reports no interval improvement over the last several weeks.  Pain is getting progressively worse.  Tentatively scheduled for reverse arthroplasty April 9. 3/27/24: Patient presents today for repeat evaluation of right shoulder pain. Patient has proximal humerus fracture from traumatic work injury that occurred on 12/8/22. At this point in time has been optimized for surgery and wishes to proceed.

## 2024-03-27 NOTE — PHYSICAL EXAM
[de-identified] : Constitutional: The general appearance of the patient is well developed, well nourished, no deformities and well groomed. Normal\par  \par  Gait: Gait and function is as follows: normal gait.\par  \par  Skin: Head and neck visualized skin is normal. Left upper extremity visualized skin is normal. Right upper extremity visualized skin is normal. Thoracic Skin of the thoracic spine shows visualized skin is normal.\par  \par  Cardiovascular: palpable radial pulse bilaterally, good capillary refill in digits of the bilateral upper extremities and no temperature or color changes in the bilateral upper extremities.\par  \par  Lymphatic: Normal Palpation of lymph nodes in the cervical.\par  \par  Neurologic: fine motor control in the bilateral upper extremities is intact. Deep Tendon Reflexes in Upper and Lower Extremities Negative Cruz's in the bilateral upper extremities. The patient is oriented to time, place and person. Sensation to light touch intact in the bilateral upper extremities. Mood and Affect is normal.\par  \par  Right Shoulder: Inspection of the shoulder/upper arm is as follows: no scapula winging, no biceps deformity and no AC joint deformity. Palpation of the shoulder/upper arm is as follows: There is tenderness at the proximal biceps tendon. Range of motion of the shoulder is as follows: Pain with internal rotation, external rotation, abduction and forward flexion. Strength of the shoulder is as follows: Supraspinatus 4/5. External Rotation 4/5. Internal Rotation 4/5. Deltoid 5/5 Ligament Stability and Special Tests of the shoulder is as follows: Neer test is positive. Sharp' test is positive. Speed's test is positive.\par  \par  Left Shoulder: Inspection of the shoulder/upper arm is as follows: There is a full, pain-free, stable range of motion of the shoulder with normal strength and no tenderness to palpation.\par  \par  Neck:\par  \par  Inspection / Palpation of the cervical spine is as follows: mild paracervical tenderness. Range of motion of the cervical spine is as follows: moderately decreased range of motion of the cervical spine. Stability testing for the cervical spine is as follows Stable range of motion.\par  \par  Back, including spine: Inspection / Palpation of the thoracic/lumbar spine is as follows: There is a full, pain free, stable range of motion of the thoracic spine with a normal tone and not tenderness to palpation..\par

## 2024-03-27 NOTE — DISCUSSION/SUMMARY
[de-identified] : 59 y/o male presenting to the office with right shoulder pain from work related injury that occurred on 12/8/22.  Patient reports traumatic slip and fall that resulted in fracture dislocation. He works as  for [employer].  He continues to note limited ROM and severe pain.  Patient is indicated for right reverse total shoulder arthroplasty and biceps tenodesis.   We had a long discussion about the risks and benefits of operative and non-operative treatment. We discussed the pertinent risks of surgery which include but are not limited to infection, pain, stiffness, arthrofibrosis, failure to alleviate symptoms, and injury to nerves or vessels. In addition, we discussed in great detail the postoperative protocol to include appropriate bracing and time of immobilization. Patient was fit for the [employer]ace in the office today. We discussed limitations in postoperative driving as well as the appropriate use of pain medication prescribed after surgery. The patient acknowledged all of the above and will proceed with the recommended surgery. Prescriptions for postoperative medications were provided. All final questions were answered to the patients satisfaction. The patient will follow up at his scheduled surgical time. As a post-operative protocol, I am prescribing an iceless cold/heat compression therapy device for at home use to be used 3-5 times per day at 40 degrees for 35 days as an alternative to pain medication. I would like my patient to begin with simultaneous cold & compression therapy at 10mmHg pressure. At the patients follow up I will determine whether they should continue with cold, or if they should transition to contrast cold/heat compression therapy. Unlike a conventional cold therapy unit that requires ice, the ThermX iceless device is set to a prescribed temperature that it will remain throughout the entire duration of use, whether that be cold compression, heat compression, or contrast compression. Cold therapy units that depend on ice melt over a very short period and do not provide compression which limits the compliance and effectiveness for pain/inflammation reduction that I am targeting for my patient. I have reached out to [facility] to supply this device as they are the exclusive provider of the ThermX and the patient will be contacted and instructed on how to utilize the device.   (1) We discussed a comprehensive treatment plans that included a prescription management plan involving the use of prescription strength medications to include Ibuprofen 600-800 mg TID, versus 500-650 mg Tylenol. We also discussed prescribing topical diclofenac (Voltaren gel) as well as once daily Meloxicam 15 mg. (2) The patient has More Than One chronic injuries/illnesses as outlined, discussed, and documented by ICD 10 codes listed, as well as the HPI and Plan section. There is a moderate risk of morbidity with further treatment, especially from use of prescription strength medications and possible side effects of these medications which include upset stomach and cardiac/renal issues with long term use were discussed. (3) I recommended that the patient follow-up with their medical physician to discuss any significant specific potential issues with long term use such as interactions with current medications or with exacerbation of underlying medical morbidities.   Attestation: I, Ana Fonseca , attest that this documentation has been prepared under the direction and in the presence of Provider Rohit Tobin MD. The documentation recorded by the scribe, in my presence, accurately reflects the service I personally performed, and the decisions made by me with my edits as appropriate. Rohit Tobin MD

## 2024-04-09 ENCOUNTER — RESULT REVIEW (OUTPATIENT)
Age: 59
End: 2024-04-09

## 2024-04-09 ENCOUNTER — APPOINTMENT (OUTPATIENT)
Dept: ORTHOPEDIC SURGERY | Facility: HOSPITAL | Age: 59
End: 2024-04-09
Payer: OTHER MISCELLANEOUS

## 2024-04-09 PROCEDURE — 20902 REMOVAL OF BONE FOR GRAFT: CPT | Mod: 59,RT

## 2024-04-09 PROCEDURE — 23395 MUSCLE TRANSFER SHOULDER/ARM: CPT | Mod: AS,59,RT

## 2024-04-09 PROCEDURE — 23615 OPTX PROX HUMRL FX W/INT FIX: CPT | Mod: 59,RT

## 2024-04-09 PROCEDURE — 23395 MUSCLE TRANSFER SHOULDER/ARM: CPT | Mod: 59,RT

## 2024-04-09 PROCEDURE — 20902 REMOVAL OF BONE FOR GRAFT: CPT | Mod: AS,59,RT

## 2024-04-09 PROCEDURE — 23615 OPTX PROX HUMRL FX W/INT FIX: CPT | Mod: AS,59,RT

## 2024-04-09 PROCEDURE — 23472 RECONSTRUCT SHOULDER JOINT: CPT | Mod: AS,RT

## 2024-04-09 PROCEDURE — 23472 RECONSTRUCT SHOULDER JOINT: CPT | Mod: RT

## 2024-04-11 RX ORDER — OXYCODONE 5 MG/1
5 TABLET ORAL
Qty: 30 | Refills: 0 | Status: ACTIVE | COMMUNITY
Start: 2024-03-27 | End: 1900-01-01

## 2024-04-17 ENCOUNTER — APPOINTMENT (OUTPATIENT)
Dept: ORTHOPEDIC SURGERY | Facility: CLINIC | Age: 59
End: 2024-04-17
Payer: OTHER MISCELLANEOUS

## 2024-04-17 DIAGNOSIS — Z97.8 PRESENCE OF OTHER SPECIFIED DEVICES: ICD-10-CM

## 2024-04-17 PROCEDURE — 73030 X-RAY EXAM OF SHOULDER: CPT | Mod: RT

## 2024-04-17 PROCEDURE — 99024 POSTOP FOLLOW-UP VISIT: CPT

## 2024-04-17 NOTE — DISCUSSION/SUMMARY
[de-identified] : 57 y/o male presenting to the office with right shoulder pain from work related injury that occurred on 12/8/22.   6 days s/p right reverse total shoulder replacement, and biceps tenodesis. Patient reports traumatic slip and fall that resulted in fracture dislocation. He works as  for [employer].  Patient reports that he did feel a pop when getting dressed. 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  Given the fact that the metallic glenoid implant has dislocated, urgent revision is necessary to event metallosis and to allow proper motion of the shoulder. There is no nonsurgical option for this issue. I did explain to the patient that x-rays immediately postop in the hospital demonstrated that the glenosphere was intact as well as intraoperative testing for stability which demonstrated this was intact as well.  Will plan for revision of humeral tray and glenosphere.  The patient is indicated for Urgent Revision of reverse shoulder arthroplasty   * Surgery: Considering patient has failed all conservative treatment we are requesting authorization for: 	Left  Revision Reverse Shoulder Arthroplasty (CPT 32027) Expedited surgery on 4/23/2024 is recommended; need to obtain instrumentation for successful revision which will be available at that time.   (1) We discussed a comprehensive treatment plans that included a prescription management plan involving the use of prescription strength medications to include Ibuprofen 600-800 mg TID, versus 500-650 mg Tylenol. We also discussed prescribing topical diclofenac (Voltaren gel) as well as once daily Meloxicam 15 mg. (2) The patient has More Than One chronic injuries/illnesses as outlined, discussed, and documented by ICD 10 codes listed, as well as the HPI and Plan section. There is a moderate risk of morbidity with further treatment, especially from use of prescription strength medications and possible side effects of these medications which include upset stomach and cardiac/renal issues with long term use were discussed. (3) I recommended that the patient follow-up with their medical physician to discuss any significant specific potential issues with long term use such as interactions with current medications or with exacerbation of underlying medical morbidities.   Attestation: I, Ana Fonseca , attest that this documentation has been prepared under the direction and in the presence of Provider Rohit Tobin MD. The documentation recorded by the scribe, in my presence, accurately reflects the service I personally performed, and the decisions made by me with my edits as appropriate. Rohit Tobin MD

## 2024-04-17 NOTE — HISTORY OF PRESENT ILLNESS
[de-identified] : 56 y/o male presenting to the office with right shoulder pain (Acute traumatic fracture) from work related injury that occurred on 12/8/22. Patient reports traumatic slip and fall that resulted in fracture dislocation. He works as  for [employer]. He has been out of work since the injury. Patient has been under the care of another orthopedic provider. He was initially treated with sling immobilizer for 4 weeks and has recently started physical therapy. He continues to note limited ROM and severe pain. Patient presents today as second opinion for surgery.  3/1/23: 56 y/o male presenting to the office with right shoulder pain (Acute traumatic fracture) from work related injury that occurred on 12/8/22. Surgery was approved, patient elects to proceed. Patient is a smoker, but states he is practicing cessation from smoking. He continues to note limited ROM and severe pain.  4/5/23: Patient presents for follow up of right shoulder pain. He continues to have pain and difficulty with ROM. He has been working on smoking cessation. He was recently approved for surgery.   6/14/23: Pt here for a follow up of right shoulder pain.  Patient reports worsening pain.  Pain has been affecting his quality of life.  He wishes to discuss surgery.  Does report elevated A1c level at recent PST.  11/15/23: Patient returns today for follow up of right shoulder pain.  Patient continues to report constant pain and limited range of motion.  He states his most recent hemoglobin A1c was 7.1.  1/17/24: Patient here for right shoulder pain. Pt admits the pain has not gotten better since last visit.  Patient is also reporting that he has crepitus pain and stiffness in his neck with radicular symptoms radiating down into his hand.  He reports that this was not present before his work-related fracture. 2/28/24: Patient returns today for a follow-up of right shoulder pain.  Reports no interval improvement over the last several weeks.  Pain is getting progressively worse.  Tentatively scheduled for reverse arthroplasty April 9. 3/27/24: Patient presents today for repeat evaluation of right shoulder pain. Patient has proximal humerus fracture from traumatic work injury that occurred on 12/8/22. At this point in time has been optimized for surgery and wishes to proceed.   4/17/24: Patient presents 6 days s/p right reverse total shoulder replacement and biceps tenodesis. Patient is doing well, pain is controlled. Patient has been compliant with the brace. Denies any fever, chills, drainage.  Patient did report feeling a pop.

## 2024-04-17 NOTE — PHYSICAL EXAM
[Right] : right shoulder [Type 2 acromion] : Type 2 acromion [de-identified] : Constitutional: The general appearance of the patient is well developed, well nourished, no deformities and well groomed. Normal\par  \par  Gait: Gait and function is as follows: normal gait.\par  \par  Skin: Head and neck visualized skin is normal. Left upper extremity visualized skin is normal. Right upper extremity visualized skin is normal. Thoracic Skin of the thoracic spine shows visualized skin is normal.\par  \par  Cardiovascular: palpable radial pulse bilaterally, good capillary refill in digits of the bilateral upper extremities and no temperature or color changes in the bilateral upper extremities.\par  \par  Lymphatic: Normal Palpation of lymph nodes in the cervical.\par  \par  Neurologic: fine motor control in the bilateral upper extremities is intact. Deep Tendon Reflexes in Upper and Lower Extremities Negative Cruz's in the bilateral upper extremities. The patient is oriented to time, place and person. Sensation to light touch intact in the bilateral upper extremities. Mood and Affect is normal.\par  \par  Right Shoulder: Inspection of the shoulder/upper arm is as follows: no scapula winging, no biceps deformity and no AC joint deformity. Palpation of the shoulder/upper arm is as follows: There is tenderness at the proximal biceps tendon. Range of motion of the shoulder is as follows: Pain with internal rotation, external rotation, abduction and forward flexion. Strength of the shoulder is as follows: Supraspinatus 4/5. External Rotation 4/5. Internal Rotation 4/5. Deltoid 5/5 Ligament Stability and Special Tests of the shoulder is as follows: Neer test is positive. Sharp' test is positive. Speed's test is positive.\par  \par  Left Shoulder: Inspection of the shoulder/upper arm is as follows: There is a full, pain-free, stable range of motion of the shoulder with normal strength and no tenderness to palpation.\par  \par  Neck:\par  \par  Inspection / Palpation of the cervical spine is as follows: mild paracervical tenderness. Range of motion of the cervical spine is as follows: moderately decreased range of motion of the cervical spine. Stability testing for the cervical spine is as follows Stable range of motion.\par  \par  Back, including spine: Inspection / Palpation of the thoracic/lumbar spine is as follows: There is a full, pain free, stable range of motion of the thoracic spine with a normal tone and not tenderness to palpation..\par   [FreeTextEntry1] : Disengaged glenosphere; with hardware failure

## 2024-04-23 ENCOUNTER — APPOINTMENT (OUTPATIENT)
Dept: ORTHOPEDIC SURGERY | Facility: HOSPITAL | Age: 59
End: 2024-04-23

## 2024-05-01 ENCOUNTER — APPOINTMENT (OUTPATIENT)
Dept: ORTHOPEDIC SURGERY | Facility: CLINIC | Age: 59
End: 2024-05-01
Payer: OTHER MISCELLANEOUS

## 2024-05-01 DIAGNOSIS — S42.291A OTHER DISPLACED FRACTURE OF UPPER END OF RIGHT HUMERUS, INITIAL ENCOUNTER FOR CLOSED FRACTURE: ICD-10-CM

## 2024-05-01 DIAGNOSIS — T85.848A PAIN DUE TO OTHER INTERNAL PROSTHETIC DEVICES, IMPLANTS AND GRAFTS, INITIAL ENCOUNTER: ICD-10-CM

## 2024-05-01 PROCEDURE — 99024 POSTOP FOLLOW-UP VISIT: CPT

## 2024-05-01 PROCEDURE — 73030 X-RAY EXAM OF SHOULDER: CPT | Mod: RT

## 2024-05-01 NOTE — DISCUSSION/SUMMARY
[de-identified] : 57 y/o 8 days s/p right revision reverse total shoulder replacement, and biceps tenodesis for hardware failure Patient reports pain is under control. He will continue with sling immobilization x 2 to 3 weeks.  Patient was provided physical therapy prescription to begin 1 month from the date of the surgery. Incision is healing well with no evidence of drainage or infection. Patient will continue to wean from pain medication.  At this point he is considered 100% temporary disabled as a result of surgery. He will follow-up in 4 weeks.    (1) We discussed a comprehensive treatment plans that included a prescription management plan involving the use of prescription strength medications to include Ibuprofen 600-800 mg TID, versus 500-650 mg Tylenol. We also discussed prescribing topical diclofenac (Voltaren gel) as well as once daily Meloxicam 15 mg. (2) The patient has More Than One chronic injuries/illnesses as outlined, discussed, and documented by ICD 10 codes listed, as well as the HPI and Plan section. There is a moderate risk of morbidity with further treatment, especially from use of prescription strength medications and possible side effects of these medications which include upset stomach and cardiac/renal issues with long term use were discussed. (3) I recommended that the patient follow-up with their medical physician to discuss any significant specific potential issues with long term use such as interactions with current medications or with exacerbation of underlying medical morbidities.   Attestation: I, Ana Fonseca , attest that this documentation has been prepared under the direction and in the presence of Provider Rohit Tobin MD. The documentation recorded by the scribe, in my presence, accurately reflects the service I personally performed, and the decisions made by me with my edits as appropriate. Rohit Tobin MD

## 2024-05-01 NOTE — HISTORY OF PRESENT ILLNESS
[de-identified] : 56 y/o male presenting to the office with right shoulder pain (Acute traumatic fracture) from work related injury that occurred on 12/8/22. Patient reports traumatic slip and fall that resulted in fracture dislocation. He works as  for [employer]. He has been out of work since the injury. Patient has been under the care of another orthopedic provider. He was initially treated with sling immobilizer for 4 weeks and has recently started physical therapy. He continues to note limited ROM and severe pain. Patient presents today as second opinion for surgery.  3/1/23: 56 y/o male presenting to the office with right shoulder pain (Acute traumatic fracture) from work related injury that occurred on 12/8/22. Surgery was approved, patient elects to proceed. Patient is a smoker, but states he is practicing cessation from smoking. He continues to note limited ROM and severe pain.  4/5/23: Patient presents for follow up of right shoulder pain. He continues to have pain and difficulty with ROM. He has been working on smoking cessation. He was recently approved for surgery.   6/14/23: Pt here for a follow up of right shoulder pain.  Patient reports worsening pain.  Pain has been affecting his quality of life.  He wishes to discuss surgery.  Does report elevated A1c level at recent PST.  11/15/23: Patient returns today for follow up of right shoulder pain.  Patient continues to report constant pain and limited range of motion.  He states his most recent hemoglobin A1c was 7.1.  1/17/24: Patient here for right shoulder pain. Pt admits the pain has not gotten better since last visit.  Patient is also reporting that he has crepitus pain and stiffness in his neck with radicular symptoms radiating down into his hand.  He reports that this was not present before his work-related fracture. 2/28/24: Patient returns today for a follow-up of right shoulder pain.  Reports no interval improvement over the last several weeks.  Pain is getting progressively worse.  Tentatively scheduled for reverse arthroplasty April 9. 3/27/24: Patient presents today for repeat evaluation of right shoulder pain. Patient has proximal humerus fracture from traumatic work injury that occurred on 12/8/22. At this point in time has been optimized for surgery and wishes to proceed.   4/17/24: Patient presents 6 days s/p right reverse total shoulder replacement and biceps tenodesis. Patient is doing well, pain is controlled. Patient has been compliant with the brace. Denies any fever, chills, drainage.  Patient did report feeling a pop. 5/1/24: Patient presents 8 days s/p right revision reverse total shoulder replacement and biceps tenodesis. Patient is doing well, pain is controlled.  Patient has been compliant with the brace. Denies any fever, chills, drainage.

## 2024-05-01 NOTE — PHYSICAL EXAM
[Right] : right shoulder [Components well fixed, in good position] : Components well fixed, in good position [de-identified] : Constitutional: The general appearance of the patient is well developed, well nourished, no deformities and well groomed. Normal\par  \par  Gait: Gait and function is as follows: normal gait.\par  \par  Skin: Head and neck visualized skin is normal. Left upper extremity visualized skin is normal. Right upper extremity visualized skin is normal. Thoracic Skin of the thoracic spine shows visualized skin is normal.\par  \par  Cardiovascular: palpable radial pulse bilaterally, good capillary refill in digits of the bilateral upper extremities and no temperature or color changes in the bilateral upper extremities.\par  \par  Lymphatic: Normal Palpation of lymph nodes in the cervical.\par  \par  Neurologic: fine motor control in the bilateral upper extremities is intact. Deep Tendon Reflexes in Upper and Lower Extremities Negative Cruz's in the bilateral upper extremities. The patient is oriented to time, place and person. Sensation to light touch intact in the bilateral upper extremities. Mood and Affect is normal.\par  \par  Right Shoulder: Inspection of the shoulder/upper arm is as follows: no scapula winging, no biceps deformity and no AC joint deformity. Palpation of the shoulder/upper arm is as follows: There is tenderness at the proximal biceps tendon. Range of motion of the shoulder is as follows: Pain with internal rotation, external rotation, abduction and forward flexion. Strength of the shoulder is as follows: Supraspinatus 4/5. External Rotation 4/5. Internal Rotation 4/5. Deltoid 5/5 Ligament Stability and Special Tests of the shoulder is as follows: Neer test is positive. Sharp' test is positive. Speed's test is positive.\par  \par  Left Shoulder: Inspection of the shoulder/upper arm is as follows: There is a full, pain-free, stable range of motion of the shoulder with normal strength and no tenderness to palpation.\par  \par  Neck:\par  \par  Inspection / Palpation of the cervical spine is as follows: mild paracervical tenderness. Range of motion of the cervical spine is as follows: moderately decreased range of motion of the cervical spine. Stability testing for the cervical spine is as follows Stable range of motion.\par  \par  Back, including spine: Inspection / Palpation of the thoracic/lumbar spine is as follows: There is a full, pain free, stable range of motion of the thoracic spine with a normal tone and not tenderness to palpation..\par

## 2024-05-29 ENCOUNTER — APPOINTMENT (OUTPATIENT)
Dept: ORTHOPEDIC SURGERY | Facility: CLINIC | Age: 59
End: 2024-05-29
Payer: OTHER MISCELLANEOUS

## 2024-05-29 DIAGNOSIS — Z96.611 PRESENCE OF RIGHT ARTIFICIAL SHOULDER JOINT: ICD-10-CM

## 2024-05-29 DIAGNOSIS — M25.511 PAIN IN RIGHT SHOULDER: ICD-10-CM

## 2024-05-29 PROCEDURE — 99024 POSTOP FOLLOW-UP VISIT: CPT

## 2024-05-29 PROCEDURE — 73030 X-RAY EXAM OF SHOULDER: CPT | Mod: RT

## 2024-05-29 NOTE — PHYSICAL EXAM
[Right] : right shoulder [Components well fixed, in good position] : Components well fixed, in good position [de-identified] : Constitutional: The general appearance of the patient is well developed, well nourished, no deformities and well groomed. Normal\par  \par  Gait: Gait and function is as follows: normal gait.\par  \par  Skin: Head and neck visualized skin is normal. Left upper extremity visualized skin is normal. Right upper extremity visualized skin is normal. Thoracic Skin of the thoracic spine shows visualized skin is normal.\par  \par  Cardiovascular: palpable radial pulse bilaterally, good capillary refill in digits of the bilateral upper extremities and no temperature or color changes in the bilateral upper extremities.\par  \par  Lymphatic: Normal Palpation of lymph nodes in the cervical.\par  \par  Neurologic: fine motor control in the bilateral upper extremities is intact. Deep Tendon Reflexes in Upper and Lower Extremities Negative Cruz's in the bilateral upper extremities. The patient is oriented to time, place and person. Sensation to light touch intact in the bilateral upper extremities. Mood and Affect is normal.\par  \par  Right Shoulder: Inspection of the shoulder/upper arm is as follows: no scapula winging, no biceps deformity and no AC joint deformity. Palpation of the shoulder/upper arm is as follows: There is tenderness at the proximal biceps tendon. Range of motion of the shoulder is as follows: Pain with internal rotation, external rotation, abduction and forward flexion. Strength of the shoulder is as follows: Supraspinatus 4/5. External Rotation 4/5. Internal Rotation 4/5. Deltoid 5/5 Ligament Stability and Special Tests of the shoulder is as follows: Neer test is positive. Sharp' test is positive. Speed's test is positive.\par  \par  Left Shoulder: Inspection of the shoulder/upper arm is as follows: There is a full, pain-free, stable range of motion of the shoulder with normal strength and no tenderness to palpation.\par  \par  Neck:\par  \par  Inspection / Palpation of the cervical spine is as follows: mild paracervical tenderness. Range of motion of the cervical spine is as follows: moderately decreased range of motion of the cervical spine. Stability testing for the cervical spine is as follows Stable range of motion.\par  \par  Back, including spine: Inspection / Palpation of the thoracic/lumbar spine is as follows: There is a full, pain free, stable range of motion of the thoracic spine with a normal tone and not tenderness to palpation..\par

## 2024-05-29 NOTE — DISCUSSION/SUMMARY
[de-identified] : 57 y/o 5 weeks s/p right revision reverse total shoulder replacement, and biceps tenodesis for hardware failure Patient reports pain is under control. Patient was provided physical therapy prescription Pt can begin pendulum exercises Supine FF demonstrated to maximize range of motion At this point he is considered 100% temporary disabled as a result of surgery. He will follow-up in 2 months   (1) We discussed a comprehensive treatment plans that included a prescription management plan involving the use of prescription strength medications to include Ibuprofen 600-800 mg TID, versus 500-650 mg Tylenol. We also discussed prescribing topical diclofenac (Voltaren gel) as well as once daily Meloxicam 15 mg. (2) The patient has More Than One chronic injuries/illnesses as outlined, discussed, and documented by ICD 10 codes listed, as well as the HPI and Plan section. There is a moderate risk of morbidity with further treatment, especially from use of prescription strength medications and possible side effects of these medications which include upset stomach and cardiac/renal issues with long term use were discussed. (3) I recommended that the patient follow-up with their medical physician to discuss any significant specific potential issues with long term use such as interactions with current medications or with exacerbation of underlying medical morbidities.   Attestation: I, Ana Fonseca , attest that this documentation has been prepared under the direction and in the presence of Provider Rohit Tobin MD. The documentation recorded by the scribe, in my presence, accurately reflects the service I personally performed, and the decisions made by me with my edits as appropriate. Rohit Tobin MD

## 2024-05-29 NOTE — HISTORY OF PRESENT ILLNESS
[de-identified] : 58 y/o male presenting to the office with right shoulder pain (Acute traumatic fracture) from work related injury that occurred on 12/8/22. Patient reports traumatic slip and fall that resulted in fracture dislocation. He works as  for [employer]. He has been out of work since the injury. Patient has been under the care of another orthopedic provider. He was initially treated with sling immobilizer for 4 weeks and has recently started physical therapy. He continues to note limited ROM and severe pain. Patient presents today as second opinion for surgery.  3/1/23: 58 y/o male presenting to the office with right shoulder pain (Acute traumatic fracture) from work related injury that occurred on 12/8/22. Surgery was approved, patient elects to proceed. Patient is a smoker, but states he is practicing cessation from smoking. He continues to note limited ROM and severe pain.  4/5/23: Patient presents for follow up of right shoulder pain. He continues to have pain and difficulty with ROM. He has been working on smoking cessation. He was recently approved for surgery.   6/14/23: Pt here for a follow up of right shoulder pain.  Patient reports worsening pain.  Pain has been affecting his quality of life.  He wishes to discuss surgery.  Does report elevated A1c level at recent PST.  11/15/23: Patient returns today for follow up of right shoulder pain.  Patient continues to report constant pain and limited range of motion.  He states his most recent hemoglobin A1c was 7.1.  1/17/24: Patient here for right shoulder pain. Pt admits the pain has not gotten better since last visit.  Patient is also reporting that he has crepitus pain and stiffness in his neck with radicular symptoms radiating down into his hand.  He reports that this was not present before his work-related fracture. 2/28/24: Patient returns today for a follow-up of right shoulder pain.  Reports no interval improvement over the last several weeks.  Pain is getting progressively worse.  Tentatively scheduled for reverse arthroplasty April 9. 3/27/24: Patient presents today for repeat evaluation of right shoulder pain. Patient has proximal humerus fracture from traumatic work injury that occurred on 12/8/22. At this point in time has been optimized for surgery and wishes to proceed.   4/17/24: Patient presents 6 days s/p right reverse total shoulder replacement and biceps tenodesis. Patient is doing well, pain is controlled. Patient has been compliant with the brace. Denies any fever, chills, drainage.  Patient did report feeling a pop. 5/1/24: Patient presents 8 days s/p right revision reverse total shoulder replacement and biceps tenodesis. Patient is doing well, pain is controlled.  Patient has been compliant with the brace. Denies any fever, chills, drainage. 5/29/24: Patient presents 5 weeks s/p right revision reverse total shoulder replacement and biceps tenodesis. Pt doing well and progressing as expected

## 2024-07-31 ENCOUNTER — APPOINTMENT (OUTPATIENT)
Dept: ORTHOPEDIC SURGERY | Facility: CLINIC | Age: 59
End: 2024-07-31
Payer: OTHER MISCELLANEOUS

## 2024-07-31 DIAGNOSIS — M25.511 PAIN IN RIGHT SHOULDER: ICD-10-CM

## 2024-07-31 DIAGNOSIS — Z96.611 PRESENCE OF RIGHT ARTIFICIAL SHOULDER JOINT: ICD-10-CM

## 2024-07-31 DIAGNOSIS — S42.291A OTHER DISPLACED FRACTURE OF UPPER END OF RIGHT HUMERUS, INITIAL ENCOUNTER FOR CLOSED FRACTURE: ICD-10-CM

## 2024-07-31 DIAGNOSIS — M75.51 BURSITIS OF RIGHT SHOULDER: ICD-10-CM

## 2024-07-31 PROCEDURE — 99214 OFFICE O/P EST MOD 30 MIN: CPT

## 2024-07-31 PROCEDURE — 73030 X-RAY EXAM OF SHOULDER: CPT | Mod: RT

## 2024-07-31 NOTE — DISCUSSION/SUMMARY
[de-identified] : 59 y/o 3 months s/p right revision reverse total shoulder replacement, and biceps tenodesis for hardware failure Patient reports pain is under control. Patient was provided physical therapy prescription Pt can begin pendulum exercises Supine FF demonstrated to maximize range of motion At this point he is considered 100% temporary disabled as a result of surgery. He will follow-up in 3 months   (1) We discussed a comprehensive treatment plans that included a prescription management plan involving the use of prescription strength medications to include Ibuprofen 600-800 mg TID, versus 500-650 mg Tylenol. We also discussed prescribing topical diclofenac (Voltaren gel) as well as once daily Meloxicam 15 mg. (2) The patient has More Than One chronic injuries/illnesses as outlined, discussed, and documented by ICD 10 codes listed, as well as the HPI and Plan section. There is a moderate risk of morbidity with further treatment, especially from use of prescription strength medications and possible side effects of these medications which include upset stomach and cardiac/renal issues with long term use were discussed. (3) I recommended that the patient follow-up with their medical physician to discuss any significant specific potential issues with long term use such as interactions with current medications or with exacerbation of underlying medical morbidities.   Attestation: I, Shahriar Martin, attest that this documentation has been prepared under the direction and in the presence of Provider Rohit Tobin MD.The documentation recorded by the scribe, in my presence, accurately reflects the service I personally performed, and the decisions made by me with my edits as appropriate. Rohit Tobin MD.

## 2024-07-31 NOTE — PHYSICAL EXAM
[Right] : right shoulder [Components well fixed, in good position] : Components well fixed, in good position [de-identified] : Constitutional: The general appearance of the patient is well developed, well nourished, no deformities and well groomed. Normal  Gait: Gait and function is as follows: normal gait.  Skin: Head and neck visualized skin is normal. Left upper extremity visualized skin is normal. Right upper extremity visualized skin is normal. Thoracic Skin of the thoracic spine shows visualized skin is normal.  Cardiovascular: palpable radial pulse bilaterally, good capillary refill in digits of the bilateral upper extremities and no temperature or color changes in the bilateral upper extremities.  Lymphatic: Normal Palpation of lymph nodes in the cervical.  Neurologic: fine motor control in the bilateral upper extremities is intact. Deep Tendon Reflexes in Upper and Lower Extremities Negative Cruz's in the bilateral upper extremities. The patient is oriented to time, place and person. Sensation to light touch intact in the bilateral upper extremities. Mood and Affect is normal.  Right Shoulder: Inspection of the shoulder/upper arm is as follows: no scapula winging, no biceps deformity and no AC joint deformity. Palpation of the shoulder/upper arm is as follows: There is tenderness at the proximal biceps tendon. Range of motion of the shoulder is as follows: Pain with internal rotation, external rotation, abduction and forward flexion. Strength of the shoulder is as follows: Supraspinatus 4/5. External Rotation 4/5. Internal Rotation 4/5. Deltoid 5/5 Ligament Stability and Special Tests of the shoulder is as follows: Neer test is positive. Sharp' test is positive. Speed's test is positive.  Left Shoulder: Inspection of the shoulder/upper arm is as follows: There is a full, pain-free, stable range of motion of the shoulder with normal strength and no tenderness to palpation.  Neck:  Inspection / Palpation of the cervical spine is as follows: mild paracervical tenderness. Range of motion of the cervical spine is as follows: moderately decreased range of motion of the cervical spine. Stability testing for the cervical spine is as follows Stable range of motion.  Back, including spine: Inspection / Palpation of the thoracic/lumbar spine is as follows: There is a full, pain free, stable range of motion of the thoracic spine with a normal tone and not tenderness to palpation..

## 2024-07-31 NOTE — HISTORY OF PRESENT ILLNESS
[de-identified] : 56 y/o male presenting to the office with right shoulder pain (Acute traumatic fracture) from work related injury that occurred on 12/8/22. Patient reports traumatic slip and fall that resulted in fracture dislocation. He works as  for [employer]  [employer]. He has been out of work since the injury. Patient has been under the care of another orthopedic provider. He was initially treated with sling immobilizer for 4 weeks and has recently started physical therapy. He continues to note limited ROM and severe pain. Patient presents today as second opinion for surgery.  3/1/23: 56 y/o male presenting to the office with right shoulder pain (Acute traumatic fracture) from work related injury that occurred on 12/8/22. Surgery was approved, patient elects to proceed. Patient is a smoker, but states he is practicing cessation from smoking. He continues to note limited ROM and severe pain.  4/5/23: Patient presents for follow up of right shoulder pain. He continues to have pain and difficulty with ROM. He has been working on smoking cessation. He was recently approved for surgery.   6/14/23: Pt here for a follow up of right shoulder pain.  Patient reports worsening pain.  Pain has been affecting his quality of life.  He wishes to discuss surgery.  Does report elevated A1c level at recent PST.  11/15/23: Patient returns today for follow up of right shoulder pain.  Patient continues to report constant pain and limited range of motion.  He states his most recent hemoglobin A1c was 7.1.  1/17/24: Patient here for right shoulder pain. Pt admits the pain has not gotten better since last visit.  Patient is also reporting that he has crepitus pain and stiffness in his neck with radicular symptoms radiating down into his hand.  He reports that this was not present before his work-related fracture. 2/28/24: Patient returns today for a follow-up of right shoulder pain.  Reports no interval improvement over the last several weeks.  Pain is getting progressively worse.  Tentatively scheduled for reverse arthroplasty April 9. 3/27/24: Patient presents today for repeat evaluation of right shoulder pain. Patient has proximal humerus fracture from traumatic work injury that occurred on 12/8/22. At this point in time has been optimized for surgery and wishes to proceed.   4/17/24: Patient presents 6 days s/p right reverse total shoulder replacement and biceps tenodesis. Patient is doing well, pain is controlled. Patient has been compliant with the brace. Denies any fever, chills, drainage.  Patient did report feeling a pop. 5/1/24: Patient presents 8 days s/p right revision reverse total shoulder replacement and biceps tenodesis. Patient is doing well, pain is controlled.  Patient has been compliant with the brace. Denies any fever, chills, drainage. 5/29/24: Patient presents 5 weeks s/p right revision reverse total shoulder replacement and biceps tenodesis. Pt doing well and progressing as expected   07/31/2024:   Patient presents 3 month s/p right revision reverse total shoulder replacement and biceps tenodesis. Pt doing well and progressing as expected.  He continues to have numbness and radicular symptoms.  His MRI of his neck was denied.  He has not yet followed up with Dr. Truong

## 2024-10-23 ENCOUNTER — APPOINTMENT (OUTPATIENT)
Dept: ORTHOPEDIC SURGERY | Facility: CLINIC | Age: 59
End: 2024-10-23
Payer: OTHER MISCELLANEOUS

## 2024-10-23 DIAGNOSIS — Z96.611 PRESENCE OF RIGHT ARTIFICIAL SHOULDER JOINT: ICD-10-CM

## 2024-10-23 DIAGNOSIS — S42.291A OTHER DISPLACED FRACTURE OF UPPER END OF RIGHT HUMERUS, INITIAL ENCOUNTER FOR CLOSED FRACTURE: ICD-10-CM

## 2024-10-23 DIAGNOSIS — M25.511 PAIN IN RIGHT SHOULDER: ICD-10-CM

## 2024-10-23 DIAGNOSIS — M75.51 BURSITIS OF RIGHT SHOULDER: ICD-10-CM

## 2024-10-23 PROCEDURE — 73030 X-RAY EXAM OF SHOULDER: CPT | Mod: RT

## 2024-10-23 PROCEDURE — 99214 OFFICE O/P EST MOD 30 MIN: CPT

## 2025-01-15 ENCOUNTER — APPOINTMENT (OUTPATIENT)
Dept: ORTHOPEDIC SURGERY | Facility: CLINIC | Age: 60
End: 2025-01-15

## 2025-04-26 NOTE — DISCUSSION/SUMMARY
DONE. [de-identified] : 58 y/o male presenting to the office with right shoulder pain from work related injury that occurred on 12/8/22. Patient reports traumatic slip and fall that resulted in fracture dislocation. He works as  for [employer].\par He continues to note limited ROM and severe pain. \par Patient wishes to proceed with surgical intervention, surgery was approved. \par Discussed continued smoking cessation.\par Patient will need a CT scan for pre operative planning, CT referral provided. \par Patient will need medical and cardiac clearance 30 days within surgery date.\par \par Follow up in 4 weeks. \par \par Patient will remain out of work until further notice\par \par Pt has an acute traumatic proximal humerus fracture and is here for surgical options.\par Arthroplasty is recommended due to the fracture pattern and the malunion.\par The patient is having severe pain with forward flexion and/or pseudoparalysis. \par The patient elects to proceed with surgical treatment.\par \par The patient is indicated for a Right reverse shoulder arthroplasty, Open Treatment of Proximal humerus fracture biceps tenodesis.\par  \par * Surgery: Considering patient has failed all conservative treatment we are proceeding with:\par -	Right shoulder Reverse Shoulder Arthroplasty (CPT 40743) with Biceps Tenodesis (CPT 98104), ORIF right proximal humerus 22688\par Pt would like surgery (surgery recently approved)\par \par The patient will require a [place] Arc 2.0 brace, cryotherapy, and 12 weeks of post-operative physical therapy.\par The patient will require a medical clearance and cardiac (smoker)\par The [facility] 2 hours, and one assistant.\par  \par \par (1) We discussed a comprehensive treatment plans that included a prescription management plan involving the use of prescription strength medications to include Ibuprofen 600-800 mg TID, versus 500-650 mg Tylenol. We also discussed prescribing topical diclofenac (Voltaren gel) as well as once daily Meloxicam 15 mg.\par (2) The patient has More Than One chronic injuries/illnesses as outlined, discussed, and documented by ICD 10 codes listed, as well as the HPI and Plan section.\par There is a moderate risk of morbidity with further treatment, especially from use of prescription strength medications and possible side effects of these medications which include upset stomach and cardiac/renal issues with long term use were discussed.\par (3) I recommended that the patient follow-up with their medical physician to discuss any significant specific potential issues with long term use such as interactions with current medications or with exacerbation of underlying medical morbidities. \par \par Attestation:\par I, Jennyfer Delgado, attest that this documentation has been prepared under the direction and in the presence of Provider Rohit oTbin MD.\par The documentation recorded by the scribe, in my presence, accurately reflects the service I personally performed, and the decisions made by me with my edits as appropriate.\par Rohit Tobin MD\par   No